# Patient Record
Sex: FEMALE | Race: BLACK OR AFRICAN AMERICAN | NOT HISPANIC OR LATINO | Employment: FULL TIME | URBAN - METROPOLITAN AREA
[De-identification: names, ages, dates, MRNs, and addresses within clinical notes are randomized per-mention and may not be internally consistent; named-entity substitution may affect disease eponyms.]

---

## 2019-01-15 ENCOUNTER — TELEPHONE (OUTPATIENT)
Dept: OBSTETRICS AND GYNECOLOGY | Facility: CLINIC | Age: 19
End: 2019-01-15

## 2019-01-15 NOTE — TELEPHONE ENCOUNTER
Returned the patient's call to the clinic. Patient requesting an appointment to follow up from heavy bleeding. Patient stated that she was saturating more than one overnight maxi pad in an hour. Patient informed that recommendations are to report to the ER to be evaluated. Patient stated that she went to [facility] and was informed to follow up with an OBGYN office. Patient stated that she was not given any medication. Patient informed that Dr. Rene doesn't have any available appointments sooner than February. Patient requesting an appointment soon on a Friday afternoon with a female provider. Patient agreed to an appointment with Dr. KEVIN Anderson on 1/25 at 3:15PM. Patient informed of the appointment location date and time. Patient instructed that if her bleeding is consistently heavy to where she is saturating more than one overnight pad in an hour or a super plus tampon that she needs to report to an ER. Patient verbalized understanding.

## 2019-01-15 NOTE — TELEPHONE ENCOUNTER
----- Message from Kristen Willis sent at 1/15/2019 11:00 AM CST -----  Contact: pt  Name of Who is Calling: Tracee      What is the request in detail: requesting a call back in reference to coming in to be seen on Friday 1/18. Pt is a new pt with medicaid wanting to be seen for heavy bleeding and cramping, Pt states she is not on her cycle. Please call an advise. I was unable to pull up  a schedule      Can the clinic reply by [facility]: no      What [facility] to [facility] Back [facility]: [phone]

## 2019-01-31 ENCOUNTER — OFFICE VISIT (OUTPATIENT)
Dept: OBSTETRICS AND GYNECOLOGY | Facility: CLINIC | Age: 19
End: 2019-01-31
Payer: MEDICAID

## 2019-01-31 VITALS — HEIGHT: 61 IN | WEIGHT: 224 LBS | BODY MASS INDEX: 42.29 KG/M2

## 2019-01-31 DIAGNOSIS — N92.6 IRREGULAR BLEEDING: Primary | ICD-10-CM

## 2019-01-31 PROCEDURE — 87480 CANDIDA DNA DIR PROBE: CPT

## 2019-01-31 PROCEDURE — 99999 PR PBB SHADOW E&M-EST. PATIENT-LVL III: ICD-10-PCS | Mod: PBBFAC,,, | Performed by: OBSTETRICS & GYNECOLOGY

## 2019-01-31 PROCEDURE — 99204 OFFICE O/P NEW MOD 45 MIN: CPT | Mod: S$PBB,,, | Performed by: OBSTETRICS & GYNECOLOGY

## 2019-01-31 PROCEDURE — 99213 OFFICE O/P EST LOW 20 MIN: CPT | Mod: PBBFAC | Performed by: OBSTETRICS & GYNECOLOGY

## 2019-01-31 PROCEDURE — 87510 GARDNER VAG DNA DIR PROBE: CPT

## 2019-01-31 PROCEDURE — 87086 URINE CULTURE/COLONY COUNT: CPT

## 2019-01-31 PROCEDURE — 99999 PR PBB SHADOW E&M-EST. PATIENT-LVL III: CPT | Mod: PBBFAC,,, | Performed by: OBSTETRICS & GYNECOLOGY

## 2019-01-31 PROCEDURE — 99204 PR OFFICE/OUTPT VISIT, NEW, LEVL IV, 45-59 MIN: ICD-10-PCS | Mod: S$PBB,,, | Performed by: OBSTETRICS & GYNECOLOGY

## 2019-01-31 PROCEDURE — 87491 CHLMYD TRACH DNA AMP PROBE: CPT

## 2019-01-31 RX ORDER — FLUTICASONE PROPIONATE 50 MCG
SPRAY, SUSPENSION (ML) NASAL
COMMUNITY
Start: 2019-01-04 | End: 2022-08-18

## 2019-01-31 RX ORDER — ALBUTEROL SULFATE 90 UG/1
AEROSOL, METERED RESPIRATORY (INHALATION)
COMMUNITY
Start: 2019-01-20 | End: 2022-08-18

## 2019-01-31 RX ORDER — NORGESTIMATE AND ETHINYL ESTRADIOL 0.25-0.035
KIT ORAL
COMMUNITY
Start: 2019-01-29 | End: 2019-02-21 | Stop reason: SDUPTHER

## 2019-01-31 RX ORDER — CETIRIZINE HYDROCHLORIDE 10 MG/1
TABLET ORAL
COMMUNITY
Start: 2019-01-04 | End: 2020-11-23 | Stop reason: SDUPTHER

## 2019-01-31 RX ORDER — FLUCONAZOLE 150 MG/1
TABLET ORAL
COMMUNITY
Start: 2019-01-25 | End: 2022-08-18

## 2019-01-31 RX ORDER — DEXTROAMPHETAMINE SACCHARATE, AMPHETAMINE ASPARTATE, DEXTROAMPHETAMINE SULFATE AND AMPHETAMINE SULFATE 7.5; 7.5; 7.5; 7.5 MG/1; MG/1; MG/1; MG/1
TABLET ORAL
COMMUNITY
Start: 2018-12-23 | End: 2022-08-18

## 2019-01-31 NOTE — PROGRESS NOTES
"CC: 19 yo G0 female, here problem visit - irregular cycles, vaginal pain    HPI: Shimon is overall well today. Complains of above.  She is a G0. Last pap smear was last year, normal. Chlamydia once, 2 years ago. SA with male partner. Using COCs for birth control. She is a [employer] in college. Cycles have always been irregular. Started pills at age 15. Most recently, she has been on Sprintec. 2 weeks ago, had heavy bleeding with associated vaginal pain. Now on the last week of her pill pack and having a regular cycle. Also having some urinary symptoms.     History reviewed. No pertinent past medical history.    History reviewed. No pertinent surgical history.    OB History     No data available          Current Outpatient Medications on File Prior to Visit   Medication Sig Dispense Refill    cetirizine (ZYRTEC) 10 MG tablet       dextroamphetamine-amphetamine 30 mg Tab       fluticasone (FLONASE) 50 mcg/actuation nasal spray       SPRINTEC, 28, 0.25-35 mg-mcg per tablet       VENTOLIN HFA 90 mcg/actuation inhaler       fluconazole (DIFLUCAN) 150 MG Tab        No current facility-administered medications on file prior to visit.          ROS:  GENERAL: Feeling well overall.   SKIN: Denies rash or lesions.   HEAD: Denies head injury or headache.   CHEST: Denies chest pain or shortness of breath.   CARDIOVASCULAR: Denies palpitations or left sided chest pain.   ABDOMEN: Mild abdominal discomfort   URINARY: Some dysuria  REPRODUCTIVE: See HPI.   HEMATOLOGIC: No easy bruisability  MUSCULOSKELETAL: Denies joint pain or swelling.   NEUROLOGIC: Denies syncope or weakness.   PSYCHIATRIC: Denies depression, anxiety or mood swings.    Physical Exam:   Ht 5' 1" (1.549 m)   Wt 101.6 kg (224 lb)   LMP 01/28/2019 (Exact Date)   BMI 42.32 kg/m²   General: No distress, well appearing  Heart: Regular rate  Lungs: No increased work of breathing  Abdomen: soft, nontender, no masses  MS: lower extremeties symmetrical, no " edema  Pelvic Exam:     GENITALIA: Normal external genitalia, no lesions   URETHRA: normal appearing   Bladder non tender   VAGINA: normal vaginal mucosa, no lesions   CERVIX: no lesions visible, no CMT   Psych: Normal affect, mood appropriate     ASSESSMENT/PLAN: 19 yo G0 here for problem visit - AUB, vaginal pain, dysuria.    1. Urine culture collected and sent  2. Affirm and GC/CT collected and sent  3. Reviewed option for pill taper to stop prolonged bleeding, then return to cyclic COCs. She is on last week of pill pack and she would prefer to see how her bleeding is after this week. Only started Sprintec a few months ago.  4.  If everything is normal, will consider pelvic US   5.  Never had Gardisil vaccine. She is interested in starting. Ordered today.     Aura Anderson MD  Obstetrics and Gynecology  [facility]

## 2019-02-01 LAB
C TRACH DNA SPEC QL NAA+PROBE: NOT DETECTED
CANDIDA RRNA VAG QL PROBE: NEGATIVE
G VAGINALIS RRNA GENITAL QL PROBE: POSITIVE
N GONORRHOEA DNA SPEC QL NAA+PROBE: NOT DETECTED
T VAGINALIS RRNA GENITAL QL PROBE: NEGATIVE

## 2019-02-02 LAB — BACTERIA UR CULT: NO GROWTH

## 2019-02-04 ENCOUNTER — TELEPHONE (OUTPATIENT)
Dept: OBSTETRICS AND GYNECOLOGY | Facility: CLINIC | Age: 19
End: 2019-02-04

## 2019-02-04 RX ORDER — METRONIDAZOLE 500 MG/1
500 TABLET ORAL EVERY 12 HOURS
Qty: 14 TABLET | Refills: 0 | Status: SHIPPED | OUTPATIENT
Start: 2019-02-04 | End: 2019-02-11

## 2019-02-04 NOTE — TELEPHONE ENCOUNTER
Tried calling patient to review results. No answer, but left brief VM for her. She does not have my chart access.    Urine culture negative (no evidence of UTI)  Gonorrhea/chlamydia negative  Affirm showed bacterial vaginosis and I will send a prescription for flagyl for her, 500 mg twice daily x 7 days. She should avoid alcohol with this medication. Bacterial vaginosis is caused by overgrowth of the bacteria that occur natually in the vagina. The main symptom is increased discharge with a strong fishy odor. Some women are asymptomatic.     If she still has irregular bleeding after the placebo, then she should let us know. Hopefully, it will just take a few more months on the Sprintec before her cycles regulate.     I would be happy to talk to her about the above if she has any questions/concerns.    Aura Anderson MD  [facility]  Obstetrics & Gynecology    [address] [place] [id number]  Work: [phone]

## 2019-02-04 NOTE — TELEPHONE ENCOUNTER
----- Message from Roxann Antoine sent at 2/4/2019 12:31 PM CST -----  Contact: GEORGE KENYON [[id number]]  Name of Who is Calling: GEORGE KENYON [[id number]]    What is the request in detail: Please call the patient back regarding test results.       Can the clinic reply by [facility]: no      What Number to [facility] Back [facility] not [facility]: [phone]

## 2019-02-21 RX ORDER — NORGESTIMATE AND ETHINYL ESTRADIOL 0.25-0.035
1 KIT ORAL DAILY
Qty: 90 TABLET | Refills: 3 | Status: SHIPPED | OUTPATIENT
Start: 2019-02-21 | End: 2020-02-25

## 2019-02-21 NOTE — TELEPHONE ENCOUNTER
----- Message from Courtney Castaneda sent at 2/21/2019 11:26 AM CST -----  Contact: self  Pt is calling about bc pill that was never sent to the pharmacy. Pt will like the perscpetion sent to the [facility] pharmacy on [address]. Pt can be reached at [phone].

## 2019-06-25 ENCOUNTER — OFFICE VISIT (OUTPATIENT)
Dept: OBSTETRICS AND GYNECOLOGY | Facility: CLINIC | Age: 19
End: 2019-06-25
Payer: MEDICAID

## 2019-06-25 VITALS
DIASTOLIC BLOOD PRESSURE: 84 MMHG | BODY MASS INDEX: 44.5 KG/M2 | WEIGHT: 235.69 LBS | SYSTOLIC BLOOD PRESSURE: 128 MMHG | HEIGHT: 61 IN

## 2019-06-25 DIAGNOSIS — N92.1 IRREGULAR INTERMENSTRUAL BLEEDING: Primary | ICD-10-CM

## 2019-06-25 LAB
B-HCG UR QL: NEGATIVE
C TRACH DNA SPEC QL NAA+PROBE: NOT DETECTED
CTP QC/QA: YES
N GONORRHOEA DNA SPEC QL NAA+PROBE: NOT DETECTED

## 2019-06-25 PROCEDURE — 99214 PR OFFICE/OUTPT VISIT, EST, LEVL IV, 30-39 MIN: ICD-10-PCS | Mod: S$PBB,,, | Performed by: OBSTETRICS & GYNECOLOGY

## 2019-06-25 PROCEDURE — 99999 PR PBB SHADOW E&M-EST. PATIENT-LVL III: ICD-10-PCS | Mod: PBBFAC,,, | Performed by: OBSTETRICS & GYNECOLOGY

## 2019-06-25 PROCEDURE — 99999 PR PBB SHADOW E&M-EST. PATIENT-LVL III: CPT | Mod: PBBFAC,,, | Performed by: OBSTETRICS & GYNECOLOGY

## 2019-06-25 PROCEDURE — 87480 CANDIDA DNA DIR PROBE: CPT

## 2019-06-25 PROCEDURE — 99214 OFFICE O/P EST MOD 30 MIN: CPT | Mod: S$PBB,,, | Performed by: OBSTETRICS & GYNECOLOGY

## 2019-06-25 PROCEDURE — 81025 URINE PREGNANCY TEST: CPT | Mod: PBBFAC | Performed by: OBSTETRICS & GYNECOLOGY

## 2019-06-25 PROCEDURE — 99213 OFFICE O/P EST LOW 20 MIN: CPT | Mod: PBBFAC | Performed by: OBSTETRICS & GYNECOLOGY

## 2019-06-25 PROCEDURE — 87491 CHLMYD TRACH DNA AMP PROBE: CPT

## 2019-06-25 PROCEDURE — 87510 GARDNER VAG DNA DIR PROBE: CPT

## 2019-06-25 RX ORDER — METRONIDAZOLE 500 MG/1
500 TABLET ORAL EVERY 12 HOURS
Qty: 14 TABLET | Refills: 0 | Status: SHIPPED | OUTPATIENT
Start: 2019-06-25 | End: 2019-07-02

## 2019-06-25 NOTE — PROGRESS NOTES
"CC: 19 yo G0 female, here problem visit - vaginitis    HPI: Shimon is overall well today. Complains of above.  She is a G0. Last pap smear was last year, normal. Chlamydia once, 2 years ago. SA with male partner. Has new partner that she met at work. Working at [employer] currently. Using COCs for birth control. She is a [employer] in college. Cycles have always been irregular. Started pills at age 15. Most recently, she has been on Sprintec. Cycles becoming more regular with pills.     She reports vaginal discharge and odor, thinks its BV. Had BV on last affirm. Recent intercourse without condom. Also uses gain and takes frequent baths. No douching, bath bombs or other changes in vulvovaginal hygiene. Still hasn't had gardisil. Also one episode of bleeding after intercourse.     History reviewed. No pertinent past medical history.    History reviewed. No pertinent surgical history.    OB History    None         Current Outpatient Medications on File Prior to Visit   Medication Sig Dispense Refill    cetirizine (ZYRTEC) 10 MG tablet       dextroamphetamine-amphetamine 30 mg Tab       fluconazole (DIFLUCAN) 150 MG Tab       fluticasone (FLONASE) 50 mcg/actuation nasal spray       SPRINTEC, 28, 0.25-35 mg-mcg per tablet Take 1 tablet by mouth once daily. 90 tablet 3    VENTOLIN HFA 90 mcg/actuation inhaler        No current facility-administered medications on file prior to visit.          ROS:  GENERAL: Feeling well overall.   SKIN: Denies rash or lesions.   HEAD: Denies head injury or headache.   CHEST: Denies chest pain or shortness of breath.   CARDIOVASCULAR: Denies palpitations or left sided chest pain.   ABDOMEN: Mild abdominal discomfort   URINARY: Some dysuria  REPRODUCTIVE: See HPI.   HEMATOLOGIC: No easy bruisability  MUSCULOSKELETAL: Denies joint pain or swelling.   NEUROLOGIC: Denies syncope or weakness.   PSYCHIATRIC: Denies depression, anxiety or mood swings.    Physical Exam:   /84   Ht 5' 1" " (1.549 m)   Wt 106.9 kg (235 lb 10.8 oz)   LMP 06/04/2019 (Approximate)   BMI 44.53 kg/m²   General: No distress, well appearing  Heart: Regular rate  Lungs: No increased work of breathing  MS: lower extremeties symmetrical, no edema  Pelvic Exam:     GENITALIA: Normal external genitalia, no lesions   URETHRA: normal appearing   VAGINA: normal vaginal mucosa, no lesions, normal appearing discharge   Psych: Normal affect, mood appropriate     UPT negative    ASSESSMENT/PLAN: 17 yo G0 here for problem visit - vaginitis symptoms.     1. Affirm and GC/CT collected and sent  2. Reviewed vulvovaginal hygiene   3. Reviewed condom use for STI prevention  4. Reviewed Gardisil recommendations and written information given. She will consider.     RTO for [employer] at age 19.     Aura Anderson MD  Obstetrics and Gynecology  [facility]

## 2019-06-26 LAB
BACTERIAL VAGINOSIS DNA: POSITIVE
CANDIDA GLABRATA DNA: NEGATIVE
CANDIDA KRUSEI DNA: NEGATIVE
CANDIDA RRNA VAG QL PROBE: NEGATIVE
T VAGINALIS RRNA GENITAL QL PROBE: NEGATIVE

## 2019-07-25 RX ORDER — NORGESTIMATE AND ETHINYL ESTRADIOL 0.25-0.035
1 KIT ORAL DAILY
Qty: 90 TABLET | Refills: 3 | Status: CANCELLED | OUTPATIENT
Start: 2019-07-25

## 2019-07-25 NOTE — TELEPHONE ENCOUNTER
Do you mind checking with Shimon to see if she has enough birth control pills? I am not sure why her request for birth control pills has been denied. She may want to contact her insurance company. I have never had this problem with sprintec before. If she needs a prescription for a different pill, I would be happy to send one for her.  Thanks!

## 2019-08-21 ENCOUNTER — PATIENT MESSAGE (OUTPATIENT)
Dept: OBSTETRICS AND GYNECOLOGY | Facility: CLINIC | Age: 19
End: 2019-08-21

## 2020-01-12 PROBLEM — R10.9 CHRONIC ABDOMINAL PAIN: Status: ACTIVE | Noted: 2020-01-12

## 2020-01-12 PROBLEM — R10.2 PELVIC PAIN IN FEMALE: Status: ACTIVE | Noted: 2020-01-12

## 2020-01-12 PROBLEM — G89.29 CHRONIC ABDOMINAL PAIN: Status: ACTIVE | Noted: 2020-01-12

## 2020-01-12 PROBLEM — F90.2 ATTENTION DEFICIT HYPERACTIVITY DISORDER (ADHD), COMBINED TYPE: Status: ACTIVE | Noted: 2020-01-12

## 2020-02-25 RX ORDER — NORGESTIMATE AND ETHINYL ESTRADIOL 0.25-0.035
1 KIT ORAL DAILY
Qty: 90 TABLET | Refills: 0 | Status: SHIPPED | OUTPATIENT
Start: 2020-02-25 | End: 2020-09-08 | Stop reason: SDUPTHER

## 2020-09-08 ENCOUNTER — OFFICE VISIT (OUTPATIENT)
Dept: OBSTETRICS AND GYNECOLOGY | Facility: CLINIC | Age: 20
End: 2020-09-08
Payer: MEDICAID

## 2020-09-08 VITALS
WEIGHT: 229.25 LBS | SYSTOLIC BLOOD PRESSURE: 102 MMHG | BODY MASS INDEX: 39.36 KG/M2 | DIASTOLIC BLOOD PRESSURE: 68 MMHG

## 2020-09-08 DIAGNOSIS — Z12.4 CERVICAL CANCER SCREENING: ICD-10-CM

## 2020-09-08 DIAGNOSIS — Z11.3 ROUTINE SCREENING FOR STI (SEXUALLY TRANSMITTED INFECTION): Primary | ICD-10-CM

## 2020-09-08 DIAGNOSIS — N92.6 IRREGULAR BLEEDING: ICD-10-CM

## 2020-09-08 PROCEDURE — 88175 CYTOPATH C/V AUTO FLUID REDO: CPT

## 2020-09-08 PROCEDURE — 87510 GARDNER VAG DNA DIR PROBE: CPT

## 2020-09-08 PROCEDURE — 99999 PR PBB SHADOW E&M-EST. PATIENT-LVL III: CPT | Mod: PBBFAC,,, | Performed by: OBSTETRICS & GYNECOLOGY

## 2020-09-08 PROCEDURE — 99395 PREV VISIT EST AGE 18-39: CPT | Mod: 25,S$PBB,, | Performed by: OBSTETRICS & GYNECOLOGY

## 2020-09-08 PROCEDURE — 87491 CHLMYD TRACH DNA AMP PROBE: CPT

## 2020-09-08 PROCEDURE — 99395 PR PREVENTIVE VISIT,EST,18-39: ICD-10-PCS | Mod: 25,S$PBB,, | Performed by: OBSTETRICS & GYNECOLOGY

## 2020-09-08 PROCEDURE — 87480 CANDIDA DNA DIR PROBE: CPT

## 2020-09-08 PROCEDURE — 99213 OFFICE O/P EST LOW 20 MIN: CPT | Mod: PBBFAC | Performed by: OBSTETRICS & GYNECOLOGY

## 2020-09-08 PROCEDURE — 99999 PR PBB SHADOW E&M-EST. PATIENT-LVL III: ICD-10-PCS | Mod: PBBFAC,,, | Performed by: OBSTETRICS & GYNECOLOGY

## 2020-09-08 RX ORDER — NORGESTIMATE AND ETHINYL ESTRADIOL 0.25-0.035
1 KIT ORAL DAILY
Qty: 90 TABLET | Refills: 3 | Status: SHIPPED | OUTPATIENT
Start: 2020-09-08 | End: 2020-11-23 | Stop reason: SDUPTHER

## 2020-09-11 LAB
CANDIDA RRNA VAG QL PROBE: NOT DETECTED
G VAGINALIS RRNA GENITAL QL PROBE: DETECTED
T VAGINALIS RRNA GENITAL QL PROBE: NOT DETECTED

## 2020-09-13 RX ORDER — METRONIDAZOLE 500 MG/1
500 TABLET ORAL EVERY 12 HOURS
Qty: 14 TABLET | Refills: 0 | Status: SHIPPED | OUTPATIENT
Start: 2020-09-13 | End: 2020-09-20

## 2020-09-23 LAB
FINAL PATHOLOGIC DIAGNOSIS: NORMAL
Lab: NORMAL

## 2020-10-06 LAB
C TRACH DNA SPEC QL NAA+PROBE: NOT DETECTED
N GONORRHOEA DNA SPEC QL NAA+PROBE: NOT DETECTED

## 2021-04-29 ENCOUNTER — PATIENT MESSAGE (OUTPATIENT)
Dept: RESEARCH | Facility: HOSPITAL | Age: 21
End: 2021-04-29

## 2023-06-13 ENCOUNTER — PATIENT MESSAGE (OUTPATIENT)
Dept: RESEARCH | Facility: HOSPITAL | Age: 23
End: 2023-06-13
Payer: MEDICAID

## 2024-01-19 ENCOUNTER — TELEPHONE (OUTPATIENT)
Dept: CARDIOLOGY | Facility: HOSPITAL | Age: 24
End: 2024-01-19
Payer: MEDICAID

## 2024-01-19 DIAGNOSIS — F90.9 ATTENTION DEFICIT HYPERACTIVITY DISORDER: Primary | ICD-10-CM

## 2024-04-02 ENCOUNTER — HOSPITAL ENCOUNTER (OUTPATIENT)
Dept: CARDIOLOGY | Facility: HOSPITAL | Age: 24
Discharge: HOME | End: 2024-04-02
Payer: MEDICAID

## 2024-04-02 DIAGNOSIS — F90.9 ATTENTION DEFICIT HYPERACTIVITY DISORDER: ICD-10-CM

## 2024-04-02 LAB
OHS QRS DURATION: 72 MS
OHS QTC CALCULATION: 435 MS

## 2024-04-02 PROCEDURE — 93005 ELECTROCARDIOGRAM TRACING: CPT

## 2024-04-02 PROCEDURE — 93010 ELECTROCARDIOGRAM REPORT: CPT | Mod: ,,, | Performed by: INTERNAL MEDICINE
